# Patient Record
Sex: MALE | Race: BLACK OR AFRICAN AMERICAN | NOT HISPANIC OR LATINO | URBAN - METROPOLITAN AREA
[De-identification: names, ages, dates, MRNs, and addresses within clinical notes are randomized per-mention and may not be internally consistent; named-entity substitution may affect disease eponyms.]

---

## 2017-01-03 ENCOUNTER — EMERGENCY (EMERGENCY)
Facility: HOSPITAL | Age: 72
LOS: 1 days | Discharge: HOME | End: 2017-01-03
Admitting: EMERGENCY MEDICINE
Payer: SELF-PAY

## 2017-01-03 VITALS
RESPIRATION RATE: 18 BRPM | DIASTOLIC BLOOD PRESSURE: 88 MMHG | SYSTOLIC BLOOD PRESSURE: 176 MMHG | HEART RATE: 62 BPM | OXYGEN SATURATION: 100 % | TEMPERATURE: 98 F

## 2017-01-03 PROCEDURE — 99283 EMERGENCY DEPT VISIT LOW MDM: CPT

## 2017-01-03 RX ORDER — ACETAMINOPHEN 500 MG
975 TABLET ORAL ONCE
Qty: 0 | Refills: 0 | Status: COMPLETED | OUTPATIENT
Start: 2017-01-03 | End: 2017-01-03

## 2017-01-03 RX ADMIN — Medication 975 MILLIGRAM(S): at 10:30

## 2017-01-03 NOTE — ED PROVIDER NOTE - PROGRESS NOTE DETAILS
The scribe's documentation has been prepared under my direction and personally reviewed by me in its entirety. I confirm that the note above accurately reflects all work, treatment, procedures, and medical decision making performed by me. Liv Medrano MS, PA-C.

## 2017-01-03 NOTE — ED PROVIDER NOTE - CARE PLAN
Principal Discharge DX:	Tooth pain  Instructions for follow-up, activity and diet:	Drink plenty of fluids  Stay on soft diet, chew on other side  Take Tylenol 650mg every 4-6 hours as needed for pain  Follow up with your private dentist or our clinic (call for an appointment: [phone])  Return to the ER for worsening

## 2017-01-03 NOTE — ED PROVIDER NOTE - NS ED MD SCRIBE ATTENDING SCRIBE SECTIONS
DISPOSITION/REVIEW OF SYSTEMS/PHYSICAL EXAM/VITAL SIGNS( Pullset)/PAST MEDICAL/SURGICAL/SOCIAL HISTORY/HISTORY OF PRESENT ILLNESS

## 2017-01-03 NOTE — ED PROVIDER NOTE - PHYSICAL EXAMINATION
Dental: percussion tenderness over right lower tooth #?, small fx in anomal (vertical fx along the lateral aspect of tooth), no separation in tooth, no surrounding gingival edema or evidence of abscess and no facial swelling.

## 2017-01-03 NOTE — ED PROVIDER NOTE - PLAN OF CARE
Drink plenty of fluids  Stay on soft diet, chew on other side  Take Tylenol 650mg every 4-6 hours as needed for pain  Follow up with your private dentist or our clinic (call for an appointment: [phone])  Return to the ER for worsening

## 2017-01-03 NOTE — ED PROVIDER NOTE - OBJECTIVE STATEMENT
70 y/o M pt w/ no significant PMHx c/o right-sided lower tooth pain x5 days. Does not have a dentist. Tried taking Aleve for pain control w/ no relief (last took yesterday). Denies fever, chills, facial swelling, ear pain, drainage or any other complaints. NKDA. 70 y/o M pt w/ no significant PMHx c/o right-sided lower tooth pain x5 days. Does not have a dentist. Tried taking Aleve for pain control w/ no relief (last took yesterday). Denies fever, chills, facial swelling, ear pain, drainage or any other complaints. NKDA. Pt has no dentist

## 2017-04-11 NOTE — ED PROVIDER NOTE - PMH
No pertinent past medical history negative Affect and characteristics of appearance, verbalizations, behaviors are appropriate

## 2018-07-26 NOTE — ED PROVIDER NOTE - HISTORY ATTESTATION, MLM
I have reviewed and confirmed nurses' notes... S/P right cataract extraction    S/P right hemicolectomy    S/P tonsillectomy

## 2022-07-15 PROBLEM — Z00.00 ENCOUNTER FOR PREVENTIVE HEALTH EXAMINATION: Status: ACTIVE | Noted: 2022-07-15

## 2022-07-21 ENCOUNTER — APPOINTMENT (OUTPATIENT)
Dept: RADIOLOGY | Facility: CLINIC | Age: 77
End: 2022-07-21

## 2022-07-21 ENCOUNTER — OUTPATIENT (OUTPATIENT)
Dept: OUTPATIENT SERVICES | Facility: HOSPITAL | Age: 77
LOS: 1 days | End: 2022-07-21
Payer: MEDICARE

## 2022-07-21 DIAGNOSIS — Z00.8 ENCOUNTER FOR OTHER GENERAL EXAMINATION: ICD-10-CM

## 2022-07-21 PROCEDURE — 77080 DXA BONE DENSITY AXIAL: CPT | Mod: 26

## 2022-07-21 PROCEDURE — 77080 DXA BONE DENSITY AXIAL: CPT

## 2022-07-22 ENCOUNTER — APPOINTMENT (OUTPATIENT)
Dept: RADIOLOGY | Facility: CLINIC | Age: 77
End: 2022-07-22

## 2022-11-08 NOTE — ED PROVIDER NOTE - CARDIAC, MLM
Patient Specific Counseling (Will Not Stick From Patient To Patient): Previous treatment with imiquimod + cryotherapy - electrodessication performed today + .2 cc of candida antigen. Risks reviewed - patient monitored for 15 minutes s/p injection. Detail Level: Zone Normal rate, regular rhythm.  Heart sounds S1, S2.  No murmurs, rubs or gallops.